# Patient Record
Sex: FEMALE | Race: BLACK OR AFRICAN AMERICAN | NOT HISPANIC OR LATINO | URBAN - METROPOLITAN AREA
[De-identification: names, ages, dates, MRNs, and addresses within clinical notes are randomized per-mention and may not be internally consistent; named-entity substitution may affect disease eponyms.]

---

## 2024-08-24 ENCOUNTER — APPOINTMENT (RX ONLY)
Dept: URBAN - METROPOLITAN AREA CLINIC 144 | Facility: CLINIC | Age: 44
Setting detail: DERMATOLOGY
End: 2024-08-24

## 2024-08-24 DIAGNOSIS — L20.9 ATOPIC DERMATITIS, UNSPECIFIED: ICD-10-CM

## 2024-08-24 DIAGNOSIS — B35.1 TINEA UNGUIUM: ICD-10-CM | Status: INADEQUATELY CONTROLLED

## 2024-08-24 DIAGNOSIS — L30.1 DYSHIDROSIS [POMPHOLYX]: ICD-10-CM

## 2024-08-24 PROCEDURE — 99204 OFFICE O/P NEW MOD 45 MIN: CPT

## 2024-08-24 PROCEDURE — ? PRESCRIPTION

## 2024-08-24 PROCEDURE — ? ADDITIONAL NOTES

## 2024-08-24 PROCEDURE — ? PRESCRIPTION MEDICATION MANAGEMENT

## 2024-08-24 PROCEDURE — ? COUNSELING

## 2024-08-24 RX ORDER — TRIAMCINOLONE ACETONIDE 1 MG/G
OINTMENT TOPICAL
Qty: 80 | Refills: 1 | Status: ERX | COMMUNITY
Start: 2024-08-24

## 2024-08-24 RX ORDER — PREDNISONE 5 MG/1
TABLET ORAL
Qty: 5 | Refills: 0 | Status: ERX | COMMUNITY
Start: 2024-08-24

## 2024-08-24 RX ORDER — TRIAMCINOLONE ACETONIDE 1 MG/G
CREAM TOPICAL
Qty: 80 | Refills: 1 | Status: ERX | COMMUNITY
Start: 2024-08-24

## 2024-08-24 RX ORDER — MUPIROCIN 20 MG/G
OINTMENT TOPICAL
Qty: 22 | Refills: 1 | Status: ERX | COMMUNITY
Start: 2024-08-24

## 2024-08-24 RX ORDER — CICLOPIROX 80 MG/ML
SOLUTION TOPICAL
Qty: 6.6 | Refills: 6 | Status: ERX | COMMUNITY
Start: 2024-08-24

## 2024-08-24 RX ADMIN — TRIAMCINOLONE ACETONIDE: 1 CREAM TOPICAL at 00:00

## 2024-08-24 RX ADMIN — CICLOPIROX: 80 SOLUTION TOPICAL at 00:00

## 2024-08-24 RX ADMIN — TRIAMCINOLONE ACETONIDE: 1 OINTMENT TOPICAL at 00:00

## 2024-08-24 RX ADMIN — MUPIROCIN: 20 OINTMENT TOPICAL at 00:00

## 2024-08-24 RX ADMIN — PREDNISONE: 5 TABLET ORAL at 00:00

## 2024-08-24 ASSESSMENT — LOCATION DETAILED DESCRIPTION DERM
LOCATION DETAILED: LEFT DISTAL POSTERIOR THIGH
LOCATION DETAILED: LEFT INFERIOR POSTERIOR NECK
LOCATION DETAILED: LEFT THENAR EMINENCE
LOCATION DETAILED: LEFT RADIAL DORSAL HAND
LOCATION DETAILED: RIGHT ULNAR PALM
LOCATION DETAILED: LEFT THUMBNAIL
LOCATION DETAILED: RIGHT INFERIOR POSTERIOR NECK
LOCATION DETAILED: RIGHT RADIAL DORSAL HAND

## 2024-08-24 ASSESSMENT — LOCATION ZONE DERM
LOCATION ZONE: HAND
LOCATION ZONE: NECK
LOCATION ZONE: LEG
LOCATION ZONE: FINGERNAIL

## 2024-08-24 ASSESSMENT — LOCATION SIMPLE DESCRIPTION DERM
LOCATION SIMPLE: LEFT POSTERIOR THIGH
LOCATION SIMPLE: LEFT HAND
LOCATION SIMPLE: LEFT THUMBNAIL
LOCATION SIMPLE: RIGHT HAND
LOCATION SIMPLE: POSTERIOR NECK

## 2024-08-24 ASSESSMENT — PAIN INTENSITY VAS: HOW INTENSE IS YOUR PAIN 0 BEING NO PAIN, 10 BEING THE MOST SEVERE PAIN POSSIBLE?: NO PAIN

## 2024-08-24 ASSESSMENT — BSA ECZEMA: % BODY COVERED IN ECZEMA: 2

## 2024-08-24 ASSESSMENT — ITCH NUMERIC RATING SCALE: HOW SEVERE IS YOUR ITCHING?: 5

## 2024-08-24 ASSESSMENT — SEVERITY ASSESSMENT 2020: SEVERITY 2020: MILD

## 2024-08-24 ASSESSMENT — BSA RASH: BSA RASH: 10

## 2024-08-24 NOTE — PROCEDURE: PRESCRIPTION MEDICATION MANAGEMENT
Detail Level: Zone
Initiate Treatment: triamcinolone acetonide 0.1 % topical ointment apply to hands and back of neck BID x 2 weeks 1 week off , PRN for flares\\n\\ntriamcinolone acetonide 0.1 % topical cream apply cream twice a day to affected areas on hands and back of neck for 2 weeks on, 1 week off, repeat PRN Flares\\n\\nprednisone 5 mg tablet take 1 pill PO QD x 5 days\\n\\nmupirocin 2 % topical ointment apply to AA on hands and neck that are open sores QD - BID
Render In Strict Bullet Format?: No
Initiate Treatment: triamcinolone acetonide 0.1 % topical ointment apply to hands and back of neck BID x 2 weeks 1 week off , PRN for flares\\n\\ntriamcinolone acetonide 0.1 % topical cream apply cream twice a day to affected areas on hands and back of neck for 2 weeks on, 1 week off, repeat PRN Flares\\n\\nprednisone 5 mg tablet take 1 pill PO QD x 5 days\\n\\nmupirocin 2 % topical ointment apply to AA on hands and neck that are open sores QD - BID.
Initiate Treatment: Ciclopirox 8% solution to nail QHS M-S then wipe off on Sunday with alcohol and repeat x 8 weeks

## 2024-09-14 ENCOUNTER — APPOINTMENT (RX ONLY)
Dept: URBAN - METROPOLITAN AREA CLINIC 94 | Facility: CLINIC | Age: 44
Setting detail: DERMATOLOGY
End: 2024-09-14

## 2024-09-14 DIAGNOSIS — L20.9 ATOPIC DERMATITIS, UNSPECIFIED: ICD-10-CM

## 2024-09-14 DIAGNOSIS — L30.1 DYSHIDROSIS [POMPHOLYX]: ICD-10-CM | Status: RESOLVING

## 2024-09-14 DIAGNOSIS — B35.1 TINEA UNGUIUM: ICD-10-CM | Status: RESOLVING

## 2024-09-14 PROCEDURE — 99214 OFFICE O/P EST MOD 30 MIN: CPT

## 2024-09-14 PROCEDURE — ? PRESCRIPTION MEDICATION MANAGEMENT

## 2024-09-14 PROCEDURE — ? PRESCRIPTION

## 2024-09-14 PROCEDURE — ? COUNSELING

## 2024-09-14 PROCEDURE — ? ADDITIONAL NOTES

## 2024-09-14 RX ORDER — ROFLUMILAST 1.5 MG/G
CREAM TOPICAL
Qty: 60 | Refills: 2 | Status: ERX | COMMUNITY
Start: 2024-09-14

## 2024-09-14 RX ORDER — CLOBETASOL PROPIONATE 0.5 MG/G
OINTMENT TOPICAL
Qty: 30 | Refills: 2 | Status: ERX | COMMUNITY
Start: 2024-09-14

## 2024-09-14 RX ADMIN — ROFLUMILAST: 1.5 CREAM TOPICAL at 00:00

## 2024-09-14 RX ADMIN — CLOBETASOL PROPIONATE: 0.5 OINTMENT TOPICAL at 00:00

## 2024-09-14 ASSESSMENT — LOCATION ZONE DERM
LOCATION ZONE: HAND
LOCATION ZONE: FINGERNAIL
LOCATION ZONE: NECK

## 2024-09-14 ASSESSMENT — LOCATION SIMPLE DESCRIPTION DERM
LOCATION SIMPLE: LEFT HAND
LOCATION SIMPLE: RIGHT HAND
LOCATION SIMPLE: LEFT THUMBNAIL
LOCATION SIMPLE: POSTERIOR NECK

## 2024-09-14 ASSESSMENT — LOCATION DETAILED DESCRIPTION DERM
LOCATION DETAILED: RIGHT INFERIOR POSTERIOR NECK
LOCATION DETAILED: RIGHT ULNAR PALM
LOCATION DETAILED: RIGHT RADIAL DORSAL HAND
LOCATION DETAILED: LEFT THUMBNAIL
LOCATION DETAILED: LEFT RADIAL DORSAL HAND
LOCATION DETAILED: LEFT THENAR EMINENCE
LOCATION DETAILED: LEFT INFERIOR POSTERIOR NECK

## 2024-09-14 NOTE — PROCEDURE: PRESCRIPTION MEDICATION MANAGEMENT
Detail Level: Zone
Discontinue Regimen: prednisone 5 mg tablet take 1 pill PO QD x 5 days
Continue Regimen: triamcinolone acetonide 0.1 % topical ointment apply to hands and back of neck BID x 2 weeks 1 week off , PRN for flares\\n\\ntriamcinolone acetonide 0.1 % topical cream apply cream twice a day to affected areas on hands and back of neck for 2 weeks on, 1 week off, repeat PRN Flares\\n\\nmupirocin 2 % topical ointment apply to AA on hands and neck that are open sores QD - BID
Initiate Treatment: clobetasol 0.05 % topical ointment \\nApply to the affected areas on the hands for two weeks on, one week off. PRN for flares. At night.
Render In Strict Bullet Format?: No
Plan: Discussed with patient possibly beginning Dupixent if current treatment regimen does not work.
Initiate Treatment: Zoryve 0.15 % topical cream \\nApply to the affected areas on the hands once a day, as needed for flares.
Continue Regimen: triamcinolone acetonide 0.1 % topical ointment apply to hands and back of neck BID x 2 weeks 1 week off , PRN for flares\\n\\ntriamcinolone acetonide 0.1 % topical cream apply cream twice a day to affected areas on hands and back of neck for 2 weeks on, 1 week off, repeat PRN Flares\\n\\nmupirocin 2 % topical ointment apply to AA on hands and neck that are open sores QD - BID.
Continue Regimen: Ciclopirox 8% solution to nail QHS M-S then wipe off on Sunday with alcohol and repeat x 8 weeks